# Patient Record
Sex: MALE | Race: AMERICAN INDIAN OR ALASKA NATIVE
[De-identification: names, ages, dates, MRNs, and addresses within clinical notes are randomized per-mention and may not be internally consistent; named-entity substitution may affect disease eponyms.]

---

## 2019-08-26 ENCOUNTER — HOSPITAL ENCOUNTER (EMERGENCY)
Dept: HOSPITAL 5 - ED | Age: 24
Discharge: HOME | End: 2019-08-26
Payer: SELF-PAY

## 2019-08-26 VITALS — DIASTOLIC BLOOD PRESSURE: 76 MMHG | SYSTOLIC BLOOD PRESSURE: 120 MMHG

## 2019-08-26 DIAGNOSIS — F14.10: ICD-10-CM

## 2019-08-26 DIAGNOSIS — F17.200: ICD-10-CM

## 2019-08-26 DIAGNOSIS — I48.91: ICD-10-CM

## 2019-08-26 DIAGNOSIS — F41.9: ICD-10-CM

## 2019-08-26 DIAGNOSIS — F10.129: Primary | ICD-10-CM

## 2019-08-26 DIAGNOSIS — F19.10: ICD-10-CM

## 2019-08-26 DIAGNOSIS — R41.82: ICD-10-CM

## 2019-08-26 LAB
ALBUMIN SERPL-MCNC: 4.1 G/DL (ref 3.9–5)
ALT SERPL-CCNC: 39 UNITS/L (ref 7–56)
BASOPHILS # (AUTO): 0.1 K/MM3 (ref 0–0.1)
BASOPHILS NFR BLD AUTO: 1.3 % (ref 0–1.8)
BENZODIAZEPINES SCREEN,URINE: (no result)
BUN SERPL-MCNC: 10 MG/DL (ref 9–20)
BUN/CREAT SERPL: 9 %
CALCIUM SERPL-MCNC: 9.3 MG/DL (ref 8.4–10.2)
EOSINOPHIL # BLD AUTO: 0 K/MM3 (ref 0–0.4)
EOSINOPHIL NFR BLD AUTO: 0.5 % (ref 0–4.3)
HCT VFR BLD CALC: 40 % (ref 35.5–45.6)
HEMOLYSIS INDEX: 3
HGB BLD-MCNC: 13.8 GM/DL (ref 11.8–15.2)
LYMPHOCYTES # BLD AUTO: 2.5 K/MM3 (ref 1.2–5.4)
LYMPHOCYTES NFR BLD AUTO: 41.3 % (ref 13.4–35)
MCHC RBC AUTO-ENTMCNC: 34 % (ref 32–34)
MCV RBC AUTO: 84 FL (ref 84–94)
METHADONE SCREEN,URINE: (no result)
MONOCYTES # (AUTO): 0.3 K/MM3 (ref 0–0.8)
MONOCYTES % (AUTO): 5.4 % (ref 0–7.3)
OPIATE SCREEN,URINE: (no result)
PLATELET # BLD: 200 K/MM3 (ref 140–440)
RBC # BLD AUTO: 4.76 M/MM3 (ref 3.65–5.03)

## 2019-08-26 PROCEDURE — 72125 CT NECK SPINE W/O DYE: CPT

## 2019-08-26 PROCEDURE — 80307 DRUG TEST PRSMV CHEM ANLYZR: CPT

## 2019-08-26 PROCEDURE — 70450 CT HEAD/BRAIN W/O DYE: CPT

## 2019-08-26 PROCEDURE — 82550 ASSAY OF CK (CPK): CPT

## 2019-08-26 PROCEDURE — 83735 ASSAY OF MAGNESIUM: CPT

## 2019-08-26 PROCEDURE — 84484 ASSAY OF TROPONIN QUANT: CPT

## 2019-08-26 PROCEDURE — 71045 X-RAY EXAM CHEST 1 VIEW: CPT

## 2019-08-26 PROCEDURE — G0480 DRUG TEST DEF 1-7 CLASSES: HCPCS

## 2019-08-26 PROCEDURE — 36415 COLL VENOUS BLD VENIPUNCTURE: CPT

## 2019-08-26 PROCEDURE — 85025 COMPLETE CBC W/AUTO DIFF WBC: CPT

## 2019-08-26 PROCEDURE — 93010 ELECTROCARDIOGRAM REPORT: CPT

## 2019-08-26 PROCEDURE — 80053 COMPREHEN METABOLIC PANEL: CPT

## 2019-08-26 PROCEDURE — 80320 DRUG SCREEN QUANTALCOHOLS: CPT

## 2019-08-26 PROCEDURE — 93005 ELECTROCARDIOGRAM TRACING: CPT

## 2019-08-26 NOTE — EMERGENCY DEPARTMENT REPORT
ED General Adult HPI





- General


Chief complaint: Chest Pain


Stated complaint: DRUG ABUSE


Time Seen by Provider: 19 05:58


Source: EMS (EMS documentation not available at the time of chart dictation), RN

notes reviewed


Mode of arrival: Stretcher


Limitations: Altered Mental Status, Other (the patient is intoxicated.)





- History of Present Illness


Initial comments: 





This is a 24-year-old gentleman.  This patient is not known to this provider 

previously.  As per documentation, reportedly has a past medical history of 

anxiety, and atrial fibrillation.  The patient is currently sleepy and 

intoxicated.  He does not answer open-ended or close and it questions.  History 

at this point in time is obtained from triage nursing documentation.  EMS 

documentation is not available at this time.  Reportedly, the patient was found 

lying in a parking lot, and reported chest pain after starting cocaine, 

consuming alcohol, and consuming hydrocodone tablets.  There is no documentation

of suicidality, and reportedly, the patient stated that he wants to get high.  

We do not know who contacted emergency medical services.  The patient is sleepy,

arousable, but not able to answer questions.  There is no additional history at 

this time.


-: unknown


Radiation: other


Quality: other


Consistency: other


Improves with: other


Worsens with: other


Associated Symptoms: other





- Related Data


                                  Previous Rx's











 Medication  Instructions  Recorded  Last Taken  Type


 


Naloxone HCl [Narcan Nasal Spray] 4 mg NS Q1HR PRN #1 spray 19 Unknown Rx











                                    Allergies











Allergy/AdvReac Type Severity Reaction Status Date / Time


 


No Known Allergies Allergy   Unverified 19 05:36














ED Review of Systems


ROS: 


Stated complaint: DRUG ABUSE


Other details as noted in HPI





Comment: Unobtainable due to pts medical conditions





ED Past Medical Hx





- Past Medical History


Previous Medical History?: Yes


Hx Psychiatric Treatment: Yes (anxiety)


Additional medical history: A-fib





- Social History


Smoking Status: Current Every Day Smoker


Substance Use Type: None, Cocaine





- Medications


Home Medications: 


                                Home Medications











 Medication  Instructions  Recorded  Confirmed  Last Taken  Type


 


Naloxone HCl [Narcan Nasal Spray] 4 mg NS Q1HR PRN #1 spray 19  Unknown Rx














ED Physical Exam





- General


Limitations: Altered Mental Status


General appearance: in no apparent distress, appears intoxicated





- Head


Head exam: Present: atraumatic, normocephalic





- Eye


Eye exam: Present: normal appearance, PERRL, EOMI





- ENT


ENT exam: Present: normal exam, normal orophraynx, mucous membranes moist, 

normal external ear exam





- Neck


Neck exam: Present: normal inspection, full ROM.  Absent: tenderness, 

meningismus





- Respiratory


Respiratory exam: Present: normal lung sounds bilaterally.  Absent: respiratory 

distress





- Cardiovascular


Cardiovascular Exam: Present: regular rate, normal rhythm, normal heart sounds. 

Absent: bradycardia, tachycardia, irregular rhythm, systolic murmur, diastolic 

murmur, rubs, gallop





- GI/Abdominal


GI/Abdominal exam: Present: soft.  Absent: distended, tenderness, guarding, 

rebound, rigid, pulsatile mass





- Rectal


Rectal exam: Present: deferred





- Extremities Exam


Extremities exam: Present: normal inspection, full ROM (passive range of motion 

is intact for the bilateral upper and lower extremities.  Patient noted to be 

moving 4 extremities spontaneously without difficulty.), other (2+ pulses noted 

in the bilateral upper, lower extremities.  There is no long bony tenderness.  

The pelvis is stable.  Muscular compartments are soft.).  Absent: pedal edema, 

joint swelling, calf tenderness





- Back Exam


Back exam: Present: normal inspection.  Absent: tenderness, CVA tenderness (L), 

paraspinal tenderness, vertebral tenderness





- Neurological Exam


Neurological exam: Present: altered (patient appears to be intoxicated), other 

(there is no facial droop.  Moving 4 extremities spontaneously.  Detailed 

neurologic examination not possible secondary to intoxication and altered mental

status)





- Psychiatric


Psychiatric exam: Absent: agitated





- Skin


Skin exam: Present: warm, dry, intact, normal color.  Absent: rash





ED Course


                                   Vital Signs











  19





  05:33 05:44 06:35


 


Temperature 97.7 F  


 


Pulse Rate 82  47 L


 


Respiratory 16 16 17





Rate   


 


Blood Pressure 124/67  109/59


 


O2 Sat by Pulse 99  97





Oximetry   














  19





  06:46 07:11 07:15


 


Temperature   


 


Pulse Rate 69 71 70


 


Respiratory 17 20 17





Rate   


 


Blood Pressure 109/59  


 


O2 Sat by Pulse 96 96 96





Oximetry   














  19





  07:31 08:00 08:31


 


Temperature   


 


Pulse Rate 75  57 L


 


Respiratory 17  18





Rate   


 


Blood Pressure 97/45 109/57 110/66


 


O2 Sat by Pulse 96 97 97





Oximetry   














- Reevaluation(s)


Reevaluation #1: 





19 06:53


Differential diagnosis, including but not limited to: Polysubstance abuse, int

oxication, intracranial injury, cervical spine injury








Assessment and plan: 24-year-old male, with reported recreational polysubstance 

ingestion.  The patient is afebrile with reassuring vital signs.  His physical 

exam is unremarkable at this time, with the exception of intoxicated mental 

status.  He will be placed on an ER hold.  CT scan of the brain and cervical 

spine have been ordered.  Appropriate screening laboratory studies ordered.  We 

will observe the patient on a cardiac monitor, and observe for clinical 

sobriety.  Once he is sober, we will obtain additional history.


Reevaluation #2: 





19 09:48


CT scan of the brain, cervical spine negative for acute somatic disease.  

Patient moving his neck back and forth without difficulty, and has no midline 

cervical spine tenderness.  Sensation is intact to light touch and 4, cheese.  

Therefore, do not have high suspicion for cervical spine injury, and I think 

that an occult cervical spine injury is very unlikely at this point in time.  

The patient is now speaking on the phone, trying to get in touch with his mother

to arrange pickup.





He does not endorse any complaints to myself or to nursing staff.





ED Medical Decision Making





- Lab Data


Result diagrams: 


                                 19 05:44





                                 19 05:44








                                   Vital Signs











  19





  05:33 05:44


 


Temperature 97.7 F 


 


Pulse Rate 82 


 


Respiratory 16 16





Rate  


 


Blood Pressure 124/67 


 


O2 Sat by Pulse 99 





Oximetry  











                                   Lab Results











  19 Range/Units





  05:44 05:44 05:44 


 


WBC  6.0    (4.5-11.0)  K/mm3


 


RBC  4.76    (3.65-5.03)  M/mm3


 


Hgb  13.8    (11.8-15.2)  gm/dl


 


Hct  40.0    (35.5-45.6)  %


 


MCV  84    (84-94)  fl


 


MCH  29    (28-32)  pg


 


MCHC  34    (32-34)  %


 


RDW  14.0    (13.2-15.2)  %


 


Plt Count  200    (140-440)  K/mm3


 


Lymph % (Auto)  41.3 H    (13.4-35.0)  %


 


Mono % (Auto)  5.4    (0.0-7.3)  %


 


Eos % (Auto)  0.5    (0.0-4.3)  %


 


Baso % (Auto)  1.3    (0.0-1.8)  %


 


Lymph #  2.5    (1.2-5.4)  K/mm3


 


Mono #  0.3    (0.0-0.8)  K/mm3


 


Eos #  0.0    (0.0-0.4)  K/mm3


 


Baso #  0.1    (0.0-0.1)  K/mm3


 


Seg Neutrophils %  51.5    (40.0-70.0)  %


 


Seg Neutrophils #  3.1    (1.8-7.7)  K/mm3


 


Sodium   142   (137-145)  mmol/L


 


Potassium   3.7   (3.6-5.0)  mmol/L


 


Chloride   102.7   ()  mmol/L


 


Carbon Dioxide   26   (22-30)  mmol/L


 


Anion Gap   17   mmol/L


 


BUN   10   (9-20)  mg/dL


 


Creatinine   1.1   (0.8-1.5)  mg/dL


 


Estimated GFR   > 60   ml/min


 


BUN/Creatinine Ratio   9   %


 


Glucose   87   ()  mg/dL


 


Calcium   9.3   (8.4-10.2)  mg/dL


 


Total Bilirubin   0.40   (0.1-1.2)  mg/dL


 


AST   29   (5-40)  units/L


 


ALT   39   (7-56)  units/L


 


Alkaline Phosphatase   61   ()  units/L


 


Troponin T   < 0.010   (0.00-0.029)  ng/mL


 


Total Protein   6.9   (6.3-8.2)  g/dL


 


Albumin   4.1   (3.9-5)  g/dL


 


Albumin/Globulin Ratio   1.5   %


 


Plasma/Serum Alcohol    0.12 H  (0-0.07)  %














- EKG Data


-: EKG Interpreted by Me


EKG shows normal: sinus rhythm


Rate: normal





- EKG Data


When compared to previous EKG there are: previous EKG unavailable





19 06:54


This is a normal sinus rhythm, normal axis,  ms, high left ventricular 

voltage, there is motion artifact, there is no prior EKG available for 

comparison, the EKG is not consistent with ST elevation myocardial infarction.





- Radiology Data


Radiology results: pending, report reviewed, image reviewed





Print Report


Referring Physician:   LUPE LOU


Patient Name:   AMANDA DIAZ


Patient ID:   [id number]


YOB: 1995


Sex:   Male


Accession:   [id number]


Report Date:   2019


Report Status:   Finalized


Findings


[facility] 


[address] [place] [address] 





XRay Report 


Signed 





Patient: AMANDA DIAZ MR#:  


[id number] 


: 1995 Acct:[id number] 





Age/Sex: 24 / M ADM Date: 19 





Loc: ED 


Attending Dr: 








Ordering Physician: LUPE LOU MD 


Date of Service: 19 


Procedure(s): XR chest 1V ap 


Accession Number(s): [id number] 





cc: LUPE LOU MD 





Fluoro Time In Minutes: 





CHEST 1 VIEW 





INDICATION: 


Chest Pain. 





COMPARISON: 


None. 





FINDINGS: 


Support devices: None. 





Heart: Within normal limits. 


Lungs/Pleura: No acute air space or interstitial disease. 





Additional findings: None. 





IMPRESSION: No acute abnormality. 





Signer Name: Mohamud Haines MD 


Signed: 2019 5:57 AM 


Workstation Name: [id number] 








Transcribed By: ES 


Dictated By: Mohamud Haines MD 


Electronically Authenticated By: Mohamud Haines MD 


Signed Date/Time: 19 0557 


Critical care attestation.: 


If time is entered above; I have spent that time in minutes in the direct care 

of this critically ill patient, excluding procedure time.








ED Disposition


Clinical Impression: 


 Alcohol intoxication, Polysubstance abuse





Disposition: DC-01 TO HOME OR SELFCARE


Is pt being admited?: No


Does the pt Need Aspirin: No


Condition: Good


Instructions:  Polysubstance Abuse (ED)


Additional Instructions: 


I recommend the patient discontinue or limit alcohol consumption in the future. 

Recommend the patient does not combine alcohol with prescription drugs or 

recreational drugs.  Combination of alcohol with multiple drugs may cause respir

atory depression, death, disability, paralysis, loss of quality of life.  

Recommend patient not drive or operate motor vehicles until cleared to do so by 

a primary care doctor.  Recommend follow-up with the primary care doctor within 

the next week.  Return to the emergency room right away projectile vomiting, 

change in mental status, confusion, inability to tolerate liquid feeds, new, 

worsening or different symptoms not present on the initial emergency room 

evaluation.








[place] Narcan medication as needed/directed for opioid overdose, including 

change in mental status, respiratory depression


Referrals: 


PRIMARY CARE,MD [Primary Care Provider] - 3-5 Days


[facility] [Provider Group] - 3-5 Days


[facility] [Provider Group] - 3-5 Days

## 2019-08-26 NOTE — CAT SCAN REPORT
CT head without contrast



INDICATION : intox found down ams.



TECHNIQUE:  Axial imaging performed from the skull apex through the skull base without the use of con
trast.  All CT scans at this location are performed using CT dose reduction for ALARA by means of aut
omated exposure control. 



COMPARISON:  None



FINDINGS:  



Parenchyma:  No acute intracranial hemorrhage or parenchymal abnormality.

Ventricles:  Ventricles are normal in size and appear symmetric.   

Soft tissues:  Soft tissues including the orbits appear normal.   

Bones:  No acute osseous abnormality.   

Sinuses:  Sinuses and mastoid air cells are clear.





IMPRESSION: No acute abnormality.



Signer Name: Juvencio Burrell MD 

Signed: 8/26/2019 8:59 AM

 Workstation Name: [id number]

## 2019-08-26 NOTE — XRAY REPORT
CHEST 1 VIEW 



INDICATION: 

Chest Pain.



COMPARISON: 

None.



FINDINGS:

Support devices: None.



Heart: Within normal limits. 

Lungs/Pleura: No acute air space or interstitial disease. 



Additional findings: None.



IMPRESSION: No acute abnormality.



Signer Name: Mohamud Haines MD 

Signed: 8/26/2019 5:57 AM

 Workstation Name: [id number]

## 2019-08-26 NOTE — CAT SCAN REPORT
CT cervical spine spine without contrast



INDICATION:  intox found down ams.



TECHNIQUE:  Axial imaging performed through the cervical spine without the use of contrast.  Sagittal
 and coronal reconstructed images were also reviewed.  All CT scans at this location are performed us
ing CT dose reduction for ALARA by means of automated exposure control. 



COMPARISON:  None



FINDINGS:



The images are pixelated which limits the exam.



Alignment:  Spinal alignment is normal.  

Bones:  There is no gross acute osseous abnormality.  Mild multilevel discogenic DJD is present.

Soft tissues:  No acute or significant incidental soft tissue abnormality.



IMPRESSION:  No gross acute abnormality on this limited pixelated exam.



Signer Name: Juvencio Burrell MD 

Signed: 8/26/2019 9:02 AM

 Workstation Name: [id number]